# Patient Record
Sex: FEMALE | Race: WHITE | NOT HISPANIC OR LATINO | Employment: UNEMPLOYED | URBAN - NONMETROPOLITAN AREA
[De-identification: names, ages, dates, MRNs, and addresses within clinical notes are randomized per-mention and may not be internally consistent; named-entity substitution may affect disease eponyms.]

---

## 2023-12-04 ENCOUNTER — APPOINTMENT (OUTPATIENT)
Dept: LAB | Facility: MEDICAL CENTER | Age: 43
End: 2023-12-04
Payer: MEDICARE

## 2023-12-06 ENCOUNTER — OFFICE VISIT (OUTPATIENT)
Dept: URGENT CARE | Facility: PHYSICIAN GROUP | Age: 43
End: 2023-12-06
Payer: MEDICARE

## 2023-12-06 VITALS
TEMPERATURE: 97.7 F | OXYGEN SATURATION: 96 % | WEIGHT: 293 LBS | DIASTOLIC BLOOD PRESSURE: 60 MMHG | HEIGHT: 67 IN | HEART RATE: 90 BPM | BODY MASS INDEX: 45.99 KG/M2 | SYSTOLIC BLOOD PRESSURE: 120 MMHG | RESPIRATION RATE: 18 BRPM

## 2023-12-06 DIAGNOSIS — R53.83 OTHER FATIGUE: ICD-10-CM

## 2023-12-06 PROCEDURE — 3078F DIAST BP <80 MM HG: CPT | Performed by: FAMILY MEDICINE

## 2023-12-06 PROCEDURE — 3074F SYST BP LT 130 MM HG: CPT | Performed by: FAMILY MEDICINE

## 2023-12-06 PROCEDURE — 99214 OFFICE O/P EST MOD 30 MIN: CPT | Performed by: FAMILY MEDICINE

## 2023-12-06 RX ORDER — ALPRAZOLAM 2 MG/1
2 TABLET, EXTENDED RELEASE ORAL 2 TIMES DAILY
COMMUNITY
Start: 2023-11-10

## 2023-12-06 RX ORDER — OLANZAPINE 20 MG/1
20 TABLET ORAL DAILY
COMMUNITY
Start: 2023-09-21

## 2023-12-06 RX ORDER — LAMOTRIGINE 200 MG/1
200 TABLET ORAL DAILY
COMMUNITY
Start: 2023-10-26

## 2023-12-06 RX ORDER — DULOXETIN HYDROCHLORIDE 30 MG/1
30 CAPSULE, DELAYED RELEASE ORAL DAILY
COMMUNITY
Start: 2023-09-22

## 2023-12-06 RX ORDER — QUETIAPINE 300 MG/1
300 TABLET, FILM COATED, EXTENDED RELEASE ORAL
COMMUNITY
Start: 2023-11-25

## 2023-12-06 RX ORDER — LUMATEPERONE 42 MG/1
42 CAPSULE ORAL DAILY
COMMUNITY
Start: 2023-12-05

## 2023-12-07 ENCOUNTER — HOSPITAL ENCOUNTER (OUTPATIENT)
Dept: LAB | Facility: MEDICAL CENTER | Age: 43
End: 2023-12-07
Attending: FAMILY MEDICINE
Payer: MEDICARE

## 2023-12-07 DIAGNOSIS — R53.83 OTHER FATIGUE: ICD-10-CM

## 2023-12-07 LAB
ALBUMIN SERPL BCP-MCNC: 4.3 G/DL (ref 3.2–4.9)
ALBUMIN/GLOB SERPL: 1.6 G/DL
ALP SERPL-CCNC: 69 U/L (ref 30–99)
ALT SERPL-CCNC: 52 U/L (ref 2–50)
ANION GAP SERPL CALC-SCNC: 14 MMOL/L (ref 7–16)
AST SERPL-CCNC: 30 U/L (ref 12–45)
BASOPHILS # BLD AUTO: 0.7 % (ref 0–1.8)
BASOPHILS # BLD: 0.06 K/UL (ref 0–0.12)
BILIRUB SERPL-MCNC: 0.2 MG/DL (ref 0.1–1.5)
BUN SERPL-MCNC: 7 MG/DL (ref 8–22)
CALCIUM ALBUM COR SERPL-MCNC: 9 MG/DL (ref 8.5–10.5)
CALCIUM SERPL-MCNC: 9.2 MG/DL (ref 8.5–10.5)
CHLORIDE SERPL-SCNC: 106 MMOL/L (ref 96–112)
CO2 SERPL-SCNC: 20 MMOL/L (ref 20–33)
CREAT SERPL-MCNC: 0.86 MG/DL (ref 0.5–1.4)
EOSINOPHIL # BLD AUTO: 0.33 K/UL (ref 0–0.51)
EOSINOPHIL NFR BLD: 3.7 % (ref 0–6.9)
ERYTHROCYTE [DISTWIDTH] IN BLOOD BY AUTOMATED COUNT: 43.8 FL (ref 35.9–50)
FASTING STATUS PATIENT QL REPORTED: NORMAL
GFR SERPLBLD CREATININE-BSD FMLA CKD-EPI: 87 ML/MIN/1.73 M 2
GLOBULIN SER CALC-MCNC: 2.7 G/DL (ref 1.9–3.5)
GLUCOSE SERPL-MCNC: 98 MG/DL (ref 65–99)
HCT VFR BLD AUTO: 39.3 % (ref 37–47)
HGB BLD-MCNC: 13.3 G/DL (ref 12–16)
IMM GRANULOCYTES # BLD AUTO: 0.04 K/UL (ref 0–0.11)
IMM GRANULOCYTES NFR BLD AUTO: 0.4 % (ref 0–0.9)
LYMPHOCYTES # BLD AUTO: 2.58 K/UL (ref 1–4.8)
LYMPHOCYTES NFR BLD: 28.9 % (ref 22–41)
MCH RBC QN AUTO: 31.4 PG (ref 27–33)
MCHC RBC AUTO-ENTMCNC: 33.8 G/DL (ref 32.2–35.5)
MCV RBC AUTO: 92.7 FL (ref 81.4–97.8)
MONOCYTES # BLD AUTO: 0.58 K/UL (ref 0–0.85)
MONOCYTES NFR BLD AUTO: 6.5 % (ref 0–13.4)
NEUTROPHILS # BLD AUTO: 5.35 K/UL (ref 1.82–7.42)
NEUTROPHILS NFR BLD: 59.8 % (ref 44–72)
NRBC # BLD AUTO: 0 K/UL
NRBC BLD-RTO: 0 /100 WBC (ref 0–0.2)
PLATELET # BLD AUTO: 330 K/UL (ref 164–446)
PMV BLD AUTO: 9.7 FL (ref 9–12.9)
POTASSIUM SERPL-SCNC: 4.5 MMOL/L (ref 3.6–5.5)
PROT SERPL-MCNC: 7 G/DL (ref 6–8.2)
RBC # BLD AUTO: 4.24 M/UL (ref 4.2–5.4)
SODIUM SERPL-SCNC: 140 MMOL/L (ref 135–145)
T4 FREE SERPL-MCNC: 0.83 NG/DL (ref 0.93–1.7)
TSH SERPL DL<=0.005 MIU/L-ACNC: 8.29 UIU/ML (ref 0.38–5.33)
WBC # BLD AUTO: 8.9 K/UL (ref 4.8–10.8)

## 2023-12-07 PROCEDURE — 80053 COMPREHEN METABOLIC PANEL: CPT

## 2023-12-07 PROCEDURE — 85025 COMPLETE CBC W/AUTO DIFF WBC: CPT

## 2023-12-07 PROCEDURE — 84439 ASSAY OF FREE THYROXINE: CPT

## 2023-12-07 PROCEDURE — 36415 COLL VENOUS BLD VENIPUNCTURE: CPT

## 2023-12-07 PROCEDURE — 84443 ASSAY THYROID STIM HORMONE: CPT

## 2023-12-07 NOTE — PROGRESS NOTES
"Chief Complaint   Patient presents with    Fatigue     Concerned about thyroid- very fatigued, sleeping a lot, dry skin, hair loss. Is on psych meds and not sure if that's the reason why she feels this way.            HPI:      She has long hx of schizoaffective disorder, anxiety, depression and currently on multiple psychiatric medications.    She feels her mood is stable.           She c/o severe fatigue x 6 wks        She is sleeping 16 hr per day over the last month.   She does not snore.       Previously was on synthroid and cytomel for underactive thyroid.   She has not had either of these medications > 1 yr       She does not have heavy periods or other unusual bleeding.            Past Medical History:   Diagnosis Date    Hypothyroidism 4/6/2010    SCHIZOAFFECTIVE DISORDER 4/6/2010     Social History     Tobacco Use    Smoking status: Former     Current packs/day: 1.50     Average packs/day: 1.5 packs/day for 23.1 years (34.6 ttl pk-yrs)     Types: Cigarettes     Start date: 12/1/2000    Smokeless tobacco: Never   Vaping Use    Vaping Use: Every day    Substances: Nicotine   Substance Use Topics    Alcohol use: No     Comment: rare    Drug use: Never         Review of Systems   Constitutional: Negative for fever, chills   Eyes: Negative for vision changes, d/c.    Respiratory: Negative for cough and sputum production.    Cardiovascular: Negative for chest pain and palpitations.   Gastrointestinal: Negative for nausea, vomiting, abdominal pain, diarrhea and constipation.   Genitourinary: Negative for dysuria, urgency and frequency.   Skin: Negative for rash or  itching.   Neurological: Negative for dizziness and tingling.   Psychiatric/Behavioral: Negative for depression.   Hematologic/lymphatic - denies bruising or excessive bleeding  All other systems reviewed and are negative.        OBJECTIVE  /60   Pulse 90   Temp 36.5 °C (97.7 °F) (Temporal)   Resp 18   Ht 1.702 m (5' 7\")   Wt (!) 136 kg (300 " lb)   SpO2 96%   HEENT - PERRLA, EOMI  Thyroid:   no masses, no TTP. No enlargement  Neuro - alert and oriented x3. CN 2-12 grossly intact.  Lungs - CTA. No wheezes, rhonchi or rales.  Heart - regular rate and rhythm without murmur.  Abdomen - soft and non-tender, bowel sounds active x4.  Musculoskeletal - No lower extremity edema noted.  Brando's sign negative bilaterally            A/P:    1. Other fatigue   Suspect due to untreated hypothyroidism    Will check labs:    - TSH  - Comp Metabolic Panel; Future  - CBC WITH DIFFERENTIAL; Future  - Comp Metabolic Panel; Future  - CBC WITH DIFFERENTIAL; Future  - TSH+FREE T4  - Referral to establish with PCP

## 2023-12-10 DIAGNOSIS — E03.9 HYPOTHYROIDISM, UNSPECIFIED TYPE: ICD-10-CM

## 2023-12-10 RX ORDER — LEVOTHYROXINE SODIUM 0.07 MG/1
75 TABLET ORAL DAILY
Qty: 30 TABLET | Refills: 0 | Status: SHIPPED | OUTPATIENT
Start: 2023-12-10 | End: 2023-12-19 | Stop reason: SDUPTHER

## 2023-12-13 SDOH — ECONOMIC STABILITY: HOUSING INSECURITY: IN THE LAST 12 MONTHS, HOW MANY PLACES HAVE YOU LIVED?: 1

## 2023-12-13 SDOH — ECONOMIC STABILITY: FOOD INSECURITY: WITHIN THE PAST 12 MONTHS, YOU WORRIED THAT YOUR FOOD WOULD RUN OUT BEFORE YOU GOT MONEY TO BUY MORE.: NEVER TRUE

## 2023-12-13 SDOH — ECONOMIC STABILITY: TRANSPORTATION INSECURITY
IN THE PAST 12 MONTHS, HAS THE LACK OF TRANSPORTATION KEPT YOU FROM MEDICAL APPOINTMENTS OR FROM GETTING MEDICATIONS?: NO

## 2023-12-13 SDOH — ECONOMIC STABILITY: INCOME INSECURITY: HOW HARD IS IT FOR YOU TO PAY FOR THE VERY BASICS LIKE FOOD, HOUSING, MEDICAL CARE, AND HEATING?: NOT VERY HARD

## 2023-12-13 SDOH — ECONOMIC STABILITY: HOUSING INSECURITY
IN THE LAST 12 MONTHS, WAS THERE A TIME WHEN YOU DID NOT HAVE A STEADY PLACE TO SLEEP OR SLEPT IN A SHELTER (INCLUDING NOW)?: NO

## 2023-12-13 SDOH — ECONOMIC STABILITY: FOOD INSECURITY: WITHIN THE PAST 12 MONTHS, THE FOOD YOU BOUGHT JUST DIDN'T LAST AND YOU DIDN'T HAVE MONEY TO GET MORE.: NEVER TRUE

## 2023-12-13 SDOH — HEALTH STABILITY: PHYSICAL HEALTH: ON AVERAGE, HOW MANY MINUTES DO YOU ENGAGE IN EXERCISE AT THIS LEVEL?: 0 MIN

## 2023-12-13 SDOH — HEALTH STABILITY: PHYSICAL HEALTH: ON AVERAGE, HOW MANY DAYS PER WEEK DO YOU ENGAGE IN MODERATE TO STRENUOUS EXERCISE (LIKE A BRISK WALK)?: 0 DAYS

## 2023-12-13 SDOH — ECONOMIC STABILITY: INCOME INSECURITY: IN THE LAST 12 MONTHS, WAS THERE A TIME WHEN YOU WERE NOT ABLE TO PAY THE MORTGAGE OR RENT ON TIME?: NO

## 2023-12-13 SDOH — HEALTH STABILITY: MENTAL HEALTH
STRESS IS WHEN SOMEONE FEELS TENSE, NERVOUS, ANXIOUS, OR CAN'T SLEEP AT NIGHT BECAUSE THEIR MIND IS TROUBLED. HOW STRESSED ARE YOU?: VERY MUCH

## 2023-12-13 SDOH — ECONOMIC STABILITY: TRANSPORTATION INSECURITY
IN THE PAST 12 MONTHS, HAS LACK OF RELIABLE TRANSPORTATION KEPT YOU FROM MEDICAL APPOINTMENTS, MEETINGS, WORK OR FROM GETTING THINGS NEEDED FOR DAILY LIVING?: NO

## 2023-12-13 SDOH — ECONOMIC STABILITY: TRANSPORTATION INSECURITY
IN THE PAST 12 MONTHS, HAS LACK OF TRANSPORTATION KEPT YOU FROM MEETINGS, WORK, OR FROM GETTING THINGS NEEDED FOR DAILY LIVING?: NO

## 2023-12-13 ASSESSMENT — SOCIAL DETERMINANTS OF HEALTH (SDOH)
IN A TYPICAL WEEK, HOW MANY TIMES DO YOU TALK ON THE PHONE WITH FAMILY, FRIENDS, OR NEIGHBORS?: THREE TIMES A WEEK
HOW OFTEN DO YOU ATTENT MEETINGS OF THE CLUB OR ORGANIZATION YOU BELONG TO?: NEVER
HOW OFTEN DO YOU HAVE A DRINK CONTAINING ALCOHOL: NEVER
DO YOU BELONG TO ANY CLUBS OR ORGANIZATIONS SUCH AS CHURCH GROUPS UNIONS, FRATERNAL OR ATHLETIC GROUPS, OR SCHOOL GROUPS?: NO
ARE YOU MARRIED, WIDOWED, DIVORCED, SEPARATED, NEVER MARRIED, OR LIVING WITH A PARTNER?: NEVER MARRIED
HOW HARD IS IT FOR YOU TO PAY FOR THE VERY BASICS LIKE FOOD, HOUSING, MEDICAL CARE, AND HEATING?: NOT VERY HARD
HOW MANY DRINKS CONTAINING ALCOHOL DO YOU HAVE ON A TYPICAL DAY WHEN YOU ARE DRINKING: PATIENT DOES NOT DRINK
WITHIN THE PAST 12 MONTHS, YOU WORRIED THAT YOUR FOOD WOULD RUN OUT BEFORE YOU GOT THE MONEY TO BUY MORE: NEVER TRUE
HOW OFTEN DO YOU ATTEND CHURCH OR RELIGIOUS SERVICES?: NEVER
ARE YOU MARRIED, WIDOWED, DIVORCED, SEPARATED, NEVER MARRIED, OR LIVING WITH A PARTNER?: NEVER MARRIED
DO YOU BELONG TO ANY CLUBS OR ORGANIZATIONS SUCH AS CHURCH GROUPS UNIONS, FRATERNAL OR ATHLETIC GROUPS, OR SCHOOL GROUPS?: NO
IN A TYPICAL WEEK, HOW MANY TIMES DO YOU TALK ON THE PHONE WITH FAMILY, FRIENDS, OR NEIGHBORS?: THREE TIMES A WEEK
HOW OFTEN DO YOU ATTEND CHURCH OR RELIGIOUS SERVICES?: NEVER
HOW OFTEN DO YOU GET TOGETHER WITH FRIENDS OR RELATIVES?: NEVER
HOW OFTEN DO YOU ATTENT MEETINGS OF THE CLUB OR ORGANIZATION YOU BELONG TO?: NEVER
HOW OFTEN DO YOU GET TOGETHER WITH FRIENDS OR RELATIVES?: NEVER
HOW OFTEN DO YOU HAVE SIX OR MORE DRINKS ON ONE OCCASION: NEVER

## 2023-12-13 ASSESSMENT — LIFESTYLE VARIABLES
AUDIT-C TOTAL SCORE: 0
HOW OFTEN DO YOU HAVE A DRINK CONTAINING ALCOHOL: NEVER
HOW OFTEN DO YOU HAVE SIX OR MORE DRINKS ON ONE OCCASION: NEVER
HOW MANY STANDARD DRINKS CONTAINING ALCOHOL DO YOU HAVE ON A TYPICAL DAY: PATIENT DOES NOT DRINK
SKIP TO QUESTIONS 9-10: 1

## 2023-12-19 ENCOUNTER — OFFICE VISIT (OUTPATIENT)
Dept: INTERNAL MEDICINE | Facility: OTHER | Age: 43
End: 2023-12-19
Payer: MEDICARE

## 2023-12-19 VITALS
HEIGHT: 67 IN | WEIGHT: 293 LBS | HEART RATE: 115 BPM | BODY MASS INDEX: 45.99 KG/M2 | SYSTOLIC BLOOD PRESSURE: 110 MMHG | OXYGEN SATURATION: 92 % | DIASTOLIC BLOOD PRESSURE: 74 MMHG

## 2023-12-19 DIAGNOSIS — R00.0 SINUS TACHYCARDIA: ICD-10-CM

## 2023-12-19 DIAGNOSIS — E66.01 CLASS 3 SEVERE OBESITY WITHOUT SERIOUS COMORBIDITY WITH BODY MASS INDEX (BMI) OF 45.0 TO 49.9 IN ADULT, UNSPECIFIED OBESITY TYPE (HCC): ICD-10-CM

## 2023-12-19 DIAGNOSIS — Z23 NEED FOR DTAP VACCINE: ICD-10-CM

## 2023-12-19 DIAGNOSIS — E03.9 HYPOTHYROIDISM, UNSPECIFIED TYPE: Primary | ICD-10-CM

## 2023-12-19 DIAGNOSIS — Z86.39 PERSONAL HISTORY OF OTHER ENDOCRINE, NUTRITIONAL AND METABOLIC DISEASE: ICD-10-CM

## 2023-12-19 DIAGNOSIS — Z86.39 HISTORY OF HIGH CHOLESTEROL: ICD-10-CM

## 2023-12-19 DIAGNOSIS — Z11.59 ENCOUNTER FOR HEPATITIS C SCREENING TEST FOR LOW RISK PATIENT: ICD-10-CM

## 2023-12-19 PROBLEM — Z87.891 PERSONAL HISTORY OF TOBACCO USE: Status: ACTIVE | Noted: 2023-12-19

## 2023-12-19 PROBLEM — E66.813 CLASS 3 SEVERE OBESITY WITHOUT SERIOUS COMORBIDITY WITH BODY MASS INDEX (BMI) OF 45.0 TO 49.9 IN ADULT: Status: ACTIVE | Noted: 2023-12-19

## 2023-12-19 PROBLEM — F17.200 VAPING NICOTINE DEPENDENCE, NON-TOBACCO PRODUCT: Status: ACTIVE | Noted: 2023-12-19

## 2023-12-19 PROCEDURE — 3074F SYST BP LT 130 MM HG: CPT

## 2023-12-19 PROCEDURE — 3078F DIAST BP <80 MM HG: CPT

## 2023-12-19 PROCEDURE — 99204 OFFICE O/P NEW MOD 45 MIN: CPT | Mod: GC

## 2023-12-19 RX ORDER — LEVOTHYROXINE SODIUM 0.07 MG/1
75 TABLET ORAL
Qty: 30 TABLET | Refills: 0 | Status: SHIPPED | OUTPATIENT
Start: 2023-12-19 | End: 2024-02-05 | Stop reason: SDUPTHER

## 2023-12-19 ASSESSMENT — ENCOUNTER SYMPTOMS
WHEEZING: 0
MYALGIAS: 0
FEVER: 0
CHILLS: 0
NAUSEA: 0
HEADACHES: 0
HEARTBURN: 0
VOMITING: 0
WEAKNESS: 0
SHORTNESS OF BREATH: 0
NERVOUS/ANXIOUS: 1
ORTHOPNEA: 0
ABDOMINAL PAIN: 0
COUGH: 0
PALPITATIONS: 0
DIZZINESS: 0

## 2023-12-19 ASSESSMENT — FIBROSIS 4 INDEX: FIB4 SCORE: 0.52

## 2023-12-19 ASSESSMENT — LIFESTYLE VARIABLES: SUBSTANCE_ABUSE: 0

## 2023-12-19 NOTE — PROGRESS NOTES
New Patient    Nicole Guerrero is a 43 y.o. female who presents today with the following:    CC: Establish Care with Primary Care Physician and discuss hypothyroid    HPI:    Nicole is a pleasant 43-year-old female with a history of schizoaffective disorder followed closely by psychiatry who presents to establish care and discuss her hypothyroid.    She states that she was diagnosed with hypothyroidism when she was 21, however stopped taking medication many years ago.  She recently was started on a new psychiatric medicine after which she noted that she began sleeping a lot more.  This peaked at sleeping 18 hours multiple days in a row.  After this happened she presented to urgent care where she had a CBC, CMP and thyroid panel completed.  Her TSH was found to be elevated at 8.29 with a free T4 of 0.83.  She was started on a low-dose of 75 mcg Synthroid.    Since starting the Synthroid she states that she initially was feeling agitated, though is now feeling much improved.  She reports that she is sleeping less and has less brain fog, and is no longer feeling as agitated.    Otherwise she has no acute concerns or complaints.  Reviewed her medical history which was also notable for history of high cholesterol though I do not see any record of labs in the chart.  She also has a 24-pack-year smoking history.    Reviewed her healthcare maintenance:  She reports never having a Pap smear.  She has never been sexually active and is not interested in having one.  She declines a flu vaccine  She had a recent COVID-vaccine October/2023.  Is due for her tetanus booster.      ROS:     Review of Systems   Constitutional:  Negative for chills, fever and malaise/fatigue.   Respiratory:  Negative for cough, shortness of breath and wheezing.    Cardiovascular:  Negative for chest pain, palpitations, orthopnea and leg swelling.   Gastrointestinal:  Negative for abdominal pain, heartburn, nausea and vomiting.   Musculoskeletal:   "Negative for joint pain and myalgias.   Neurological:  Negative for dizziness, weakness and headaches.   Psychiatric/Behavioral:  Negative for substance abuse. The patient is nervous/anxious.          Past Medical History:   Diagnosis Date    Hypothyroidism 4/6/2010    SCHIZOAFFECTIVE DISORDER 4/6/2010       No past surgical history on file.    Family History   Problem Relation Age of Onset    Psychiatric Illness Mother     Psychiatric Illness Father     Psychiatric Illness Sister        Social History     Tobacco Use    Smoking status: Former     Current packs/day: 1.50     Average packs/day: 1.5 packs/day for 23.0 years (34.6 ttl pk-yrs)     Types: Cigarettes     Start date: 12/1/2000    Smokeless tobacco: Never   Vaping Use    Vaping Use: Every day    Substances: Nicotine   Substance Use Topics    Alcohol use: No     Comment: rare    Drug use: Never       Current Outpatient Medications   Medication Sig Dispense Refill    levothyroxine (SYNTHROID) 75 MCG Tab Take 1 Tablet by mouth every morning on an empty stomach. 30 Tablet 0    alprazolam (XANAX XR) 2 MG XR tablet Take 2 mg by mouth 2 times a day.      DULoxetine (CYMBALTA) 30 MG Cap DR Particles Take 30 mg by mouth every day.      LAMICTAL 200 MG tablet Take 200 mg by mouth every day.      CAPLYTA 42 MG Cap Take 42 mg by mouth every day.      olanzapine (ZYPREXA) 20 MG tablet Take 20 mg by mouth every day.      quetiapine (SEROQUEL XR) 300 MG XR tablet Take 300 mg by mouth at bedtime.       No current facility-administered medications for this visit.       Physical Exam:  /74 (BP Location: Left arm)   Pulse (!) 115   Ht 1.702 m (5' 7.01\")   Wt (!) 143 kg (314 lb 6.4 oz)   SpO2 92%   BMI 49.23 kg/m²   General: Well-developed, well-nourished female in no distress  HEENT: Conjuntiva and lids are within normal limits.  External auditory canals are within normal limits.  Nasal mucosa is normal.  Oral pharynx is normal and free of exudate.  Neck: Supple, " non-tender with nothyromegaly noted.  Lympatic: Pre and Post auricular, sub-mandibular, anterior and posterior cervical and supraclavicular areas are without notable lymphadenopathy  Lungs: Clear to auscultation and perucssion bilaterally with good respiratory effort.  No wheezes, crackes or rhonci are heard.  Heart: Tachycardic, regular rhythm with no murmurs, rubs or gallops heard.  Abdomen: Soft, non-tender, non-distended with normal-active bowel sounds.  Nor organomegaly noted.  Extremites: No edema  Psych: Patient is awake, alert and oriented with recent and remote memory intact. Mood and affect are normal.     Assessment and Plan:     1. Hypothyroidism, unspecified type  2. Personal history of other endocrine, nutritional and metabolic disease  3. History of high cholesterol  4. Class 3 severe obesity without serious comorbidity with body mass index (BMI) of 45.0 to 49.9 in adult, unspecified obesity type (HCC)  5. Sinus tachycardia  6. Encounter for hepatitis C screening test for low risk patient  7. Need for DTaP vaccine    Sent refill for current dose of Synthroid.  Will repeat labs in approximately 4 to 6 weeks.  Have also included additional labs to screen for other metabolic diseases given history of high cholesterol.  Patient was due for her tetanus booster which we were planning on giving today, however unfortunately patient left prior to getting this vaccine.  Will plan to reoffer this at our follow-up visit in approximately 6 weeks.    - TSH; Future  - levothyroxine (SYNTHROID) 75 MCG Tab; Take 1 Tablet by mouth every morning on an empty stomach.  Dispense: 30 Tablet; Refill: 0  - HEMOGLOBIN A1C; Future  - Lipid Profile; Future  - HEP C VIRUS ANTIBODY; Future  - Tdap Vaccine =>6YO IM, not administered.       Return in about 6 weeks (around 1/30/2024) for lab follow up, thyroid.    Patient Instructions   Is nice meeting you today Nicole!    Today we reviewed your medical history and your recent  symptoms with your low thyroid.  It seems like you are doing well on the current regimen.  I have ordered repeat labs to be completed around January 23.  Please continue taking your thyroid medication in the morning on empty stomach and we will get your repeat labs and follow-up in approximately 6 weeks.    I have added additional labs to screen for cholesterol and diabetes and hepatitis C.    You received your tetanus booster today in clinic.    This plan to follow-up in approximately 6 weeks, feel free to reach out sooner if needed.      Signed by: Jaida Cisneros M.D.    Jaida Cisneros M.D. PGY3  [facility]

## 2023-12-19 NOTE — PATIENT INSTRUCTIONS
Is nice meeting you today Nicole!    Today we reviewed your medical history and your recent symptoms with your low thyroid.  It seems like you are doing well on the current regimen.  I have ordered repeat labs to be completed around January 23.  Please continue taking your thyroid medication in the morning on empty stomach and we will get your repeat labs and follow-up in approximately 6 weeks.    I have added additional labs to screen for cholesterol and diabetes and hepatitis C.    You received your tetanus booster today in clinic.    This plan to follow-up in approximately 6 weeks, feel free to reach out sooner if needed.

## 2024-01-24 ENCOUNTER — HOSPITAL ENCOUNTER (OUTPATIENT)
Dept: LAB | Facility: MEDICAL CENTER | Age: 44
End: 2024-01-24
Payer: MEDICARE

## 2024-01-24 DIAGNOSIS — E66.01 CLASS 3 SEVERE OBESITY WITHOUT SERIOUS COMORBIDITY WITH BODY MASS INDEX (BMI) OF 45.0 TO 49.9 IN ADULT, UNSPECIFIED OBESITY TYPE (HCC): ICD-10-CM

## 2024-01-24 DIAGNOSIS — Z86.39 PERSONAL HISTORY OF OTHER ENDOCRINE, NUTRITIONAL AND METABOLIC DISEASE: ICD-10-CM

## 2024-01-24 DIAGNOSIS — Z11.59 ENCOUNTER FOR HEPATITIS C SCREENING TEST FOR LOW RISK PATIENT: ICD-10-CM

## 2024-01-24 DIAGNOSIS — E03.9 HYPOTHYROIDISM, UNSPECIFIED TYPE: ICD-10-CM

## 2024-01-24 DIAGNOSIS — Z86.39 HISTORY OF HIGH CHOLESTEROL: ICD-10-CM

## 2024-01-24 LAB
CHOLEST SERPL-MCNC: 220 MG/DL (ref 100–199)
EST. AVERAGE GLUCOSE BLD GHB EST-MCNC: 103 MG/DL
FASTING STATUS PATIENT QL REPORTED: NORMAL
HBA1C MFR BLD: 5.2 % (ref 4–5.6)
HCV AB SER QL: NORMAL
HDLC SERPL-MCNC: 40 MG/DL
LDLC SERPL CALC-MCNC: 140 MG/DL
TRIGL SERPL-MCNC: 202 MG/DL (ref 0–149)
TSH SERPL DL<=0.005 MIU/L-ACNC: 2.95 UIU/ML (ref 0.38–5.33)

## 2024-01-24 PROCEDURE — 80061 LIPID PANEL: CPT

## 2024-01-24 PROCEDURE — 83036 HEMOGLOBIN GLYCOSYLATED A1C: CPT | Mod: GA

## 2024-01-24 PROCEDURE — 36415 COLL VENOUS BLD VENIPUNCTURE: CPT

## 2024-01-24 PROCEDURE — 84443 ASSAY THYROID STIM HORMONE: CPT

## 2024-01-24 PROCEDURE — 86803 HEPATITIS C AB TEST: CPT

## 2024-02-05 ENCOUNTER — OFFICE VISIT (OUTPATIENT)
Dept: INTERNAL MEDICINE | Facility: OTHER | Age: 44
End: 2024-02-05
Payer: MEDICARE

## 2024-02-05 VITALS
WEIGHT: 293 LBS | OXYGEN SATURATION: 92 % | DIASTOLIC BLOOD PRESSURE: 74 MMHG | SYSTOLIC BLOOD PRESSURE: 109 MMHG | BODY MASS INDEX: 45.99 KG/M2 | TEMPERATURE: 96.7 F | HEIGHT: 67 IN | HEART RATE: 88 BPM

## 2024-02-05 DIAGNOSIS — E03.9 HYPOTHYROIDISM, UNSPECIFIED TYPE: ICD-10-CM

## 2024-02-05 DIAGNOSIS — F25.0 SCHIZOAFFECTIVE DISORDER, BIPOLAR TYPE (HCC): ICD-10-CM

## 2024-02-05 DIAGNOSIS — E78.5 HYPERLIPIDEMIA, UNSPECIFIED HYPERLIPIDEMIA TYPE: Primary | ICD-10-CM

## 2024-02-05 DIAGNOSIS — R03.0 ELEVATED BLOOD PRESSURE READING: ICD-10-CM

## 2024-02-05 DIAGNOSIS — E78.01 FAMILIAL HYPERCHOLESTEROLEMIA: ICD-10-CM

## 2024-02-05 PROBLEM — F17.200 VAPING NICOTINE DEPENDENCE, NON-TOBACCO PRODUCT: Status: RESOLVED | Noted: 2023-12-19 | Resolved: 2024-02-05

## 2024-02-05 PROBLEM — Z86.39 HISTORY OF HIGH CHOLESTEROL: Status: RESOLVED | Noted: 2023-12-19 | Resolved: 2024-02-05

## 2024-02-05 PROCEDURE — 3078F DIAST BP <80 MM HG: CPT

## 2024-02-05 PROCEDURE — 99214 OFFICE O/P EST MOD 30 MIN: CPT | Mod: GC

## 2024-02-05 PROCEDURE — 3074F SYST BP LT 130 MM HG: CPT

## 2024-02-05 RX ORDER — ALPRAZOLAM 1 MG/1
1 TABLET ORAL
COMMUNITY
Start: 2024-01-17

## 2024-02-05 RX ORDER — LEVOTHYROXINE SODIUM 0.07 MG/1
75 TABLET ORAL
Qty: 30 TABLET | Refills: 5 | Status: SHIPPED | OUTPATIENT
Start: 2024-02-05

## 2024-02-05 ASSESSMENT — FIBROSIS 4 INDEX: FIB4 SCORE: 0.52

## 2024-02-05 NOTE — PROGRESS NOTES
Established Patient    Patient Care Team:  Jaida Cisneros M.D. as PCP - General (Internal Medicine)    Nicole Guerrero is a 43 y.o. female who presents today with the following Chief Complaint(s): Follow up for The primary encounter diagnosis was Hyperlipidemia, unspecified hyperlipidemia type. Diagnoses of Familial hypercholesterolemia, Elevated blood pressure reading, Hypothyroidism, unspecified type, and Schizoaffective disorder, bipolar type (HCC) were also pertinent to this visit.    HPI:  Problem   Familial Hypercholesterolemia    Reports multiple family members on maternal side with high cholesterol, including her mother who is not overweight and follows a healthy plant based diet.      Elevated Blood Pressure Reading    Elevated BP in office today initial  and .   Patient reports known anxiety symptoms when at the doctors.   BP and HR normal on repeat, 109/74 and HR 88.  Reports home SBP in the 110s--120s.      Hyperlipidemia    Reviewed recent labs 1/20224:    TC: 220  Triglycerides: 202  HDL: 40  LDL: 140    ASCVD: 1%    Patient reports she is vegetarian and follows a plant based diet. She does not drink alcohol.       Personal History of Tobacco Use    24-pack-year history.  Quit in June 2021  Was vaping nicotine 9908-6041, though quit 01/2024.     Schizoaffective Disorder (Hcc)    Follows psychiatry via telehealth [facility] in [facility]  Diagnosed at age 21  Had IP psych hospitalization at 23  Is doing well now though is feeling overly tired and she wonders if her antipsychotics need to be adjusted since she quit nicotine.        Hypothyroidism    Labs 12/2023 wit TSH 8.29 and FT4 0.83.  Repeat TSH 1/24/2023 2.950.  On synthroid 75mcg  Taking appropriately.     History of High Cholesterol (Resolved)   Vaping Nicotine Dependence, Non-Tobacco Product (Resolved)        ROS:     Denies any new chest pain or shortness of breath.  No changes to urinary or bowel function.  See HPI.    Past Medical  "History:   Diagnosis Date    Hypothyroidism 04/06/2010    SCHIZOAFFECTIVE DISORDER 04/06/2010    Vaping nicotine dependence, non-tobacco product 12/19/2023     Social History     Tobacco Use    Smoking status: Former     Current packs/day: 1.50     Average packs/day: 1.5 packs/day for 23.2 years (34.8 ttl pk-yrs)     Types: Cigarettes     Start date: 12/1/2000    Smokeless tobacco: Never   Vaping Use    Vaping Use: Former    Substances: Nicotine   Substance Use Topics    Alcohol use: No     Comment: rare    Drug use: Never     Current Outpatient Medications   Medication Sig Dispense Refill    levothyroxine (SYNTHROID) 75 MCG Tab Take 1 Tablet by mouth every morning on an empty stomach. 30 Tablet 5    ALPRAZolam (XANAX) 1 MG Tab Take 1 mg by mouth 1 time a day as needed for Anxiety.      alprazolam (XANAX XR) 2 MG XR tablet Take 2 mg by mouth 2 times a day.      DULoxetine (CYMBALTA) 30 MG Cap DR Particles Take 30 mg by mouth every day.      LAMICTAL 200 MG tablet Take 200 mg by mouth every day.      CAPLYTA 42 MG Cap Take 42 mg by mouth every day.      olanzapine (ZYPREXA) 20 MG tablet Take 20 mg by mouth every day.      quetiapine (SEROQUEL XR) 300 MG XR tablet Take 300 mg by mouth at bedtime.       No current facility-administered medications for this visit.     Physical Exam:  /74 (BP Location: Left arm, Patient Position: Sitting, BP Cuff Size: Large adult)   Pulse 88   Temp 35.9 °C (96.7 °F) (Temporal)   Ht 1.702 m (5' 7\")   Wt (!) 139 kg (306 lb 9.6 oz)   SpO2 92%   BMI 48.02 kg/m²   General: Well developed, well nourished female, in no distress.  Eyes: Conjuntiva without any obvious injection or erythema.   Cardiovascular: Heart is regular without murmur  Lungs: Clear to auscultation bilaterally. No wheezes, rhonci or crackles heard. Respiratory effort is normal.  Abd: Soft, non-tender  Ext: No edema      Assessment and Plan:     Patient is overall doing well. She has quit nicotine and is concerned " that her antipsychotics may need adjusting as a result. Encouraged her to follow up with psychiatry for this. She is doing well on the synthroid with repeat TSH at goal. Continue synthroid at current dose with routine lab check in 6 months. Labs did show hyperlipidemia. Patient is a vegetarian and would like to do lifestyle modifications prior to starting any medication. Given her ASCVD is 1% this is reasonable. Have placed a referral to nutrition services for assistance with diet. Discussed exercise. Will follow up with repeat lipid panel in about 6 weeks. Will continue to monitor BP at future clinic visits. Suspect she has some white coat hypertension. No indication for treatment at this time.     1. Hyperlipidemia, unspecified hyperlipidemia type  2. Familial hypercholesterolemia  - Referral to Nutrition Services  - Lipid Profile; Future    3. Elevated blood pressure reading    4. Hypothyroidism, unspecified type  - levothyroxine (SYNTHROID) 75 MCG Tab; Take 1 Tablet by mouth every morning on an empty stomach.  Dispense: 30 Tablet; Refill: 5    5. Schizoaffective disorder, bipolar type (HCC)      Return in about 6 weeks (around 3/18/2024) for hyperlipidemia.    Patient Instructions   Is good to see you today Nicole!    Today reviewed your lab work which did show high cholesterol.  As discussed we will try some lifestyle modifications with diet and exercise to bring these numbers down.  Recommend reducing any intake of fats, sugars, and alcohol.  Have also placed a referral to nutrition services if you decide this would be of benefit to you.  Recommend Camille being here at our [facility] clinic.    Otherwise your blood pressure looks good today.  It was initially elevated, though this was most likely due to a little whitecoat hypertension.  Continue to monitor and no need to start medication at this time.    Lets plan to recheck your cholesterol levels in about 6 weeks.  I will follow-up with you a few days after your  labs.    Have sent in a refill for your thyroid medication.  Please continue to take this in the morning on an empty stomach.  We will monitor your thyroid levels proximately every 6 months.    Feel free to reach out sooner if needed.    Jaida Cisneros M.D. PGY3  [facility]

## 2024-02-05 NOTE — PATIENT INSTRUCTIONS
Is good to see you today Nicole!    Today reviewed your lab work which did show high cholesterol.  As discussed we will try some lifestyle modifications with diet and exercise to bring these numbers down.  Recommend reducing any intake of fats, sugars, and alcohol.  Have also placed a referral to nutrition services if you decide this would be of benefit to you.  Recommend Camille being here at our [facility] clinic.    Otherwise your blood pressure looks good today.  It was initially elevated, though this was most likely due to a little whitecoat hypertension.  Continue to monitor and no need to start medication at this time.    Lets plan to recheck your cholesterol levels in about 6 weeks.  I will follow-up with you a few days after your labs.    Have sent in a refill for your thyroid medication.  Please continue to take this in the morning on an empty stomach.  We will monitor your thyroid levels proximately every 6 months.    Feel free to reach out sooner if needed.

## 2025-07-23 ENCOUNTER — HOSPITAL ENCOUNTER (OUTPATIENT)
Dept: LAB | Facility: MEDICAL CENTER | Age: 45
End: 2025-07-23
Attending: RADIOLOGY
Payer: MEDICARE

## 2025-07-23 LAB
ALBUMIN SERPL BCP-MCNC: 4.3 G/DL (ref 3.2–4.9)
ALBUMIN/GLOB SERPL: 1.9 G/DL
ALP SERPL-CCNC: 65 U/L (ref 30–99)
ALT SERPL-CCNC: 22 U/L (ref 2–50)
ANION GAP SERPL CALC-SCNC: 13 MMOL/L (ref 7–16)
AST SERPL-CCNC: 25 U/L (ref 12–45)
BASOPHILS # BLD AUTO: 1 % (ref 0–1.8)
BASOPHILS # BLD: 0.11 K/UL (ref 0–0.12)
BILIRUB SERPL-MCNC: 0.3 MG/DL (ref 0.1–1.5)
BUN SERPL-MCNC: 5 MG/DL (ref 8–22)
CALCIUM ALBUM COR SERPL-MCNC: 8.7 MG/DL (ref 8.5–10.5)
CALCIUM SERPL-MCNC: 8.9 MG/DL (ref 8.5–10.5)
CHLORIDE SERPL-SCNC: 97 MMOL/L (ref 96–112)
CO2 SERPL-SCNC: 19 MMOL/L (ref 20–33)
CREAT SERPL-MCNC: 0.9 MG/DL (ref 0.5–1.4)
EOSINOPHIL # BLD AUTO: 0.95 K/UL (ref 0–0.51)
EOSINOPHIL NFR BLD: 8.7 % (ref 0–6.9)
ERYTHROCYTE [DISTWIDTH] IN BLOOD BY AUTOMATED COUNT: 44.6 FL (ref 35.9–50)
GFR SERPLBLD CREATININE-BSD FMLA CKD-EPI: 82 ML/MIN/1.73 M 2
GLOBULIN SER CALC-MCNC: 2.3 G/DL (ref 1.9–3.5)
GLUCOSE SERPL-MCNC: 80 MG/DL (ref 65–99)
HCT VFR BLD AUTO: 40.1 % (ref 37–47)
HGB BLD-MCNC: 13.6 G/DL (ref 12–16)
IMM GRANULOCYTES # BLD AUTO: 0.02 K/UL (ref 0–0.11)
IMM GRANULOCYTES NFR BLD AUTO: 0.2 % (ref 0–0.9)
LYMPHOCYTES # BLD AUTO: 2.57 K/UL (ref 1–4.8)
LYMPHOCYTES NFR BLD: 23.6 % (ref 22–41)
MCH RBC QN AUTO: 30.8 PG (ref 27–33)
MCHC RBC AUTO-ENTMCNC: 33.9 G/DL (ref 32.2–35.5)
MCV RBC AUTO: 90.9 FL (ref 81.4–97.8)
MONOCYTES # BLD AUTO: 0.79 K/UL (ref 0–0.85)
MONOCYTES NFR BLD AUTO: 7.2 % (ref 0–13.4)
NEUTROPHILS # BLD AUTO: 6.47 K/UL (ref 1.82–7.42)
NEUTROPHILS NFR BLD: 59.3 % (ref 44–72)
NRBC # BLD AUTO: 0 K/UL
NRBC BLD-RTO: 0 /100 WBC (ref 0–0.2)
PLATELET # BLD AUTO: 333 K/UL (ref 164–446)
PMV BLD AUTO: 10.2 FL (ref 9–12.9)
POTASSIUM SERPL-SCNC: 4.1 MMOL/L (ref 3.6–5.5)
PROT SERPL-MCNC: 6.6 G/DL (ref 6–8.2)
RBC # BLD AUTO: 4.41 M/UL (ref 4.2–5.4)
SODIUM SERPL-SCNC: 129 MMOL/L (ref 135–145)
VALPROATE SERPL-MCNC: 24 UG/ML (ref 50–100)
WBC # BLD AUTO: 10.9 K/UL (ref 4.8–10.8)

## 2025-07-23 PROCEDURE — 80164 ASSAY DIPROPYLACETIC ACD TOT: CPT

## 2025-07-23 PROCEDURE — 85025 COMPLETE CBC W/AUTO DIFF WBC: CPT

## 2025-07-23 PROCEDURE — 36415 COLL VENOUS BLD VENIPUNCTURE: CPT

## 2025-07-23 PROCEDURE — 80053 COMPREHEN METABOLIC PANEL: CPT

## 2025-09-03 ENCOUNTER — APPOINTMENT (OUTPATIENT)
Dept: LAB | Facility: MEDICAL CENTER | Age: 45
End: 2025-09-03
Payer: MEDICARE